# Patient Record
Sex: FEMALE | Race: BLACK OR AFRICAN AMERICAN | NOT HISPANIC OR LATINO | URBAN - METROPOLITAN AREA
[De-identification: names, ages, dates, MRNs, and addresses within clinical notes are randomized per-mention and may not be internally consistent; named-entity substitution may affect disease eponyms.]

---

## 2024-02-22 ENCOUNTER — OV NP (OUTPATIENT)
Dept: URBAN - METROPOLITAN AREA CLINIC 92 | Facility: CLINIC | Age: 19
End: 2024-02-22
Payer: COMMERCIAL

## 2024-02-22 VITALS
TEMPERATURE: 98.1 F | SYSTOLIC BLOOD PRESSURE: 112 MMHG | WEIGHT: 122 LBS | BODY MASS INDEX: 20.33 KG/M2 | HEIGHT: 65 IN | HEART RATE: 88 BPM | DIASTOLIC BLOOD PRESSURE: 77 MMHG

## 2024-02-22 DIAGNOSIS — K58.1 IRRITABLE BOWEL SYNDROME WITH CONSTIPATION: ICD-10-CM

## 2024-02-22 DIAGNOSIS — K92.1 HEMATOCHEZIA: ICD-10-CM

## 2024-02-22 PROBLEM — 440630006: Status: ACTIVE | Noted: 2024-02-22

## 2024-02-22 PROCEDURE — 99203 OFFICE O/P NEW LOW 30 MIN: CPT

## 2024-02-22 RX ORDER — ONDANSETRON HYDROCHLORIDE 4 MG/1
2 TABLET TABLET, FILM COATED ORAL
Qty: 2 | Refills: 0 | OUTPATIENT
Start: 2024-02-22

## 2024-02-22 RX ORDER — LINACLOTIDE 290 UG/1
1 CAPSULE AT LEAST 30 MINUTES BEFORE THE FIRST MEAL OF THE DAY ON AN EMPTY STOMACH CAPSULE, GELATIN COATED ORAL ONCE A DAY
Qty: 90 | Refills: 3 | OUTPATIENT
Start: 2024-02-22 | End: 2025-02-16

## 2024-02-22 RX ORDER — NORETHINDRONE ACETATE AND ETHINYL ESTRADIOL AND FERROUS FUMARATE 1MG-20(21)
KIT ORAL
Qty: 84 TABLET | Status: ACTIVE | COMMUNITY

## 2024-02-22 RX ORDER — FLUOXETINE 10 MG/1
CAPSULE ORAL
Qty: 90 CAPSULE | Status: ACTIVE | COMMUNITY

## 2024-02-22 RX ORDER — POLYETHYLENE GLYCOL-3350 AND ELECTROLYTES WITH FLAVOR PACK 240; 5.84; 2.98; 6.72; 22.72 G/278.26G; G/278.26G; G/278.26G; G/278.26G; G/278.26G
4000ML POWDER, FOR SOLUTION ORAL
Qty: 4000 MILLILITER | Refills: 0 | OUTPATIENT
Start: 2024-02-22 | End: 2024-02-23

## 2024-02-22 NOTE — HPI-TODAY'S VISIT:
Patient is a 18 year old male who presents for abdominal pain.  Patient reports for the past two years she has struggled with constipation, she can go a week without a BM. She has tried Metamucil. Mag 07. Over the past month notes of worsening abdominal pain since she cannot use the restroom. Last BM was on Tuesday after she took a laxative. Occasionally, cannot feel the urge to have a BM. Admits to intermittent bright red blood per rectum and straining.  She has a fiber heavy diet. Goes to the gym 4x weekly. Denies upper GI issues. Patient reports she had an eating disorder during high school, recovered a year and a half ago. Patient was recently being treated for tonsilitis so was not eating as much.   Denies a fmhx of GI cancers.

## 2024-02-26 ENCOUNTER — COLON (OUTPATIENT)
Dept: URBAN - METROPOLITAN AREA SURGERY CENTER 16 | Facility: SURGERY CENTER | Age: 19
End: 2024-02-26

## 2024-02-26 RX ORDER — LINACLOTIDE 290 UG/1
1 CAPSULE AT LEAST 30 MINUTES BEFORE THE FIRST MEAL OF THE DAY ON AN EMPTY STOMACH CAPSULE, GELATIN COATED ORAL ONCE A DAY
Qty: 90 | Refills: 3 | Status: ACTIVE | COMMUNITY
Start: 2024-02-22 | End: 2025-02-16

## 2024-02-26 RX ORDER — NORETHINDRONE ACETATE AND ETHINYL ESTRADIOL AND FERROUS FUMARATE 1MG-20(21)
KIT ORAL
Qty: 84 TABLET | Status: ACTIVE | COMMUNITY

## 2024-02-26 RX ORDER — ONDANSETRON HYDROCHLORIDE 4 MG/1
2 TABLET TABLET, FILM COATED ORAL
Qty: 2 | Refills: 0 | Status: ACTIVE | COMMUNITY
Start: 2024-02-22

## 2024-02-26 RX ORDER — FLUOXETINE 10 MG/1
CAPSULE ORAL
Qty: 90 CAPSULE | Status: ACTIVE | COMMUNITY

## 2024-03-18 ENCOUNTER — TELEP (OUTPATIENT)
Dept: URBAN - METROPOLITAN AREA CLINIC 92 | Facility: CLINIC | Age: 19
End: 2024-03-18
Payer: COMMERCIAL

## 2024-03-18 VITALS — WEIGHT: 122 LBS | BODY MASS INDEX: 20.33 KG/M2 | HEIGHT: 65 IN

## 2024-03-18 DIAGNOSIS — K92.1 HEMATOCHEZIA: ICD-10-CM

## 2024-03-18 DIAGNOSIS — K58.1 IRRITABLE BOWEL SYNDROME WITH CONSTIPATION: ICD-10-CM

## 2024-03-18 PROCEDURE — 99213 OFFICE O/P EST LOW 20 MIN: CPT

## 2024-03-18 RX ORDER — FLUOXETINE 10 MG/1
CAPSULE ORAL
Qty: 90 CAPSULE | Status: ACTIVE | COMMUNITY

## 2024-03-18 RX ORDER — ONDANSETRON HYDROCHLORIDE 4 MG/1
2 TABLET TABLET, FILM COATED ORAL
Qty: 2 | Refills: 0 | Status: ACTIVE | COMMUNITY
Start: 2024-02-22

## 2024-03-18 RX ORDER — NORETHINDRONE ACETATE AND ETHINYL ESTRADIOL AND FERROUS FUMARATE 1MG-20(21)
KIT ORAL
Qty: 84 TABLET | Status: ACTIVE | COMMUNITY

## 2024-03-18 RX ORDER — LINACLOTIDE 290 UG/1
1 CAPSULE AT LEAST 30 MINUTES BEFORE THE FIRST MEAL OF THE DAY ON AN EMPTY STOMACH CAPSULE, GELATIN COATED ORAL ONCE A DAY
Qty: 90 | Refills: 3 | Status: ACTIVE | COMMUNITY
Start: 2024-02-22 | End: 2025-02-16

## 2024-03-18 NOTE — PHYSICAL EXAM HENT:
Head, normocephalic, atraumatic, Face, Face within normal limits, Nose/Nasopharynx, External nose normal appearance, nares patent, no nasal discharge, Lips, Appearance normal

## 2024-03-18 NOTE — HPI-TODAY'S VISIT:
Patient is a 18 year old male who presents in follow up from her colonoscopy.  Initial visit, Patient reports for the past two years she has struggled with constipation, she can go a week without a BM. She has tried Metamucil. Mag 07. Over the past month notes of worsening abdominal pain since she cannot use the restroom. Last BM was on Tuesday after she took a laxative. Occasionally, cannot feel the urge to have a BM. Admits to intermittent bright red blood per rectum and straining.  She has a fiber heavy diet. Goes to the gym 4x weekly. Denies upper GI issues. Patient reports she had an eating disorder during high school, recovered a year and a half ago. Patient was recently being treated for tonsilitis so was not eating as much.   Denies a fmhx of GI cancers. Colonoscopy on 2/26/24 with Dr. Miguel revealed a normal colon, non-thrombosed IH, no specimens collected, repeat colon at age 44.   Today, via telehealth, patient notes she is taking Linzess 290mcg with benefit but it does cause diarrhea and occasional urgency. Does not take Linzess if she knows there is no access to a restroom. Her abdominal pain has significantly improved. Even on days she has not taken Linzess she may have a BM. She is taking Metamucil daily. Denies recent hematochezia.